# Patient Record
(demographics unavailable — no encounter records)

---

## 2024-12-31 NOTE — PHYSICAL EXAM
[No Acute Distress] : no acute distress [Well Nourished] : well nourished [Well Developed] : well developed [Well-Appearing] : well-appearing [Normal Sclera/Conjunctiva] : normal sclera/conjunctiva [EOMI] : extraocular movements intact [Normal Outer Ear/Nose] : the outer ears and nose were normal in appearance [No Respiratory Distress] : no respiratory distress  [No Accessory Muscle Use] : no accessory muscle use [Clear to Auscultation] : lungs were clear to auscultation bilaterally [Normal Rate] : normal rate  [Regular Rhythm] : with a regular rhythm [Normal S1, S2] : normal S1 and S2 [No Murmur] : no murmur heard [No Edema] : there was no peripheral edema [Soft] : abdomen soft [Non Tender] : non-tender [Non-distended] : non-distended [Coordination Grossly Intact] : coordination grossly intact [No Focal Deficits] : no focal deficits [Normal Affect] : the affect was normal [Normal Insight/Judgement] : insight and judgment were intact

## 2024-12-31 NOTE — PAST MEDICAL HISTORY
[Menstruating] : menstruating [Definite ___ (Date)] : the last menstrual period was [unfilled] [Less Bleeding] : the period was lighter than normal [Normal Duration] : the duration was normal [Regular Cycle Intervals] : have been regular [Total Preg ___] : G[unfilled]

## 2025-01-03 NOTE — END OF VISIT
[] : Resident [FreeTextEntry3] : I, Dr. Allan Hughes, saw and evaluated this patient in the presence of the resident. I discussed the management with the resident. I reviewed the note and agree with the documented plan of care with the following confirmations/corrections/additions: None.

## 2025-01-03 NOTE — HEALTH RISK ASSESSMENT
[No] : No [No falls in past year] : Patient reported no falls in the past year [0] : 2) Feeling down, depressed, or hopeless: Not at all (0) [Patient reported PAP Smear was normal] : Patient reported PAP Smear was normal [With Significant Other] : lives with significant other [# of Members in Household ___] :  household currently consist of [unfilled] member(s) [Employed] : employed [] :  [Sexually Active] : sexually active [Feels Safe at Home] : Feels safe at home [Smoke Detector] : smoke detector [Carbon Monoxide Detector] : carbon monoxide detector [Seat Belt] :  uses seat belt [Sunscreen] : uses sunscreen [Never] : Never [NO] : No [PHQ-2 Negative - No further assessment needed] : PHQ-2 Negative - No further assessment needed [Good] : ~his/her~  mood as  good [de-identified] : walking [de-identified] : balanced [[id number]] : 0 [Reports changes in hearing] : Reports no changes in hearing [Reports changes in vision] : Reports no changes in vision [PapSmearDate] : 09/24 [HIVComments] : Tested during pregnancy = Negative [HepatitisCComments] : Tested during pregnancy = Negative [de-identified] :  and child [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

## 2025-01-03 NOTE — HISTORY OF PRESENT ILLNESS
[Other: _____] : [unfilled] [FreeTextEntry1] : New Establishment of Care; Medical approval to return to work [de-identified] : 30F, 5-month postpartum here to establish new care and medical approval to return to work. Previously seen by unknown primary care provider 2-3 years ago. [facility], last appointment in November. Has no acute complaints except for mild knee pain. Pt unaware of hx of anemia when told it was seen on her cbc previously. no sxms of fatigue, chest pain, [place], sob.

## 2025-01-03 NOTE — HEALTH RISK ASSESSMENT
[No] : No [No falls in past year] : Patient reported no falls in the past year [0] : 2) Feeling down, depressed, or hopeless: Not at all (0) [Patient reported PAP Smear was normal] : Patient reported PAP Smear was normal [With Significant Other] : lives with significant other [# of Members in Household ___] :  household currently consist of [unfilled] member(s) [Employed] : employed [] :  [Sexually Active] : sexually active [Feels Safe at Home] : Feels safe at home [Smoke Detector] : smoke detector [Carbon Monoxide Detector] : carbon monoxide detector [Seat Belt] :  uses seat belt [Sunscreen] : uses sunscreen [Never] : Never [NO] : No [PHQ-2 Negative - No further assessment needed] : PHQ-2 Negative - No further assessment needed [Good] : ~his/her~  mood as  good [de-identified] : walking [de-identified] : balanced [[id number]] : 0 [Reports changes in hearing] : Reports no changes in hearing [Reports changes in vision] : Reports no changes in vision [PapSmearDate] : 09/24 [HIVComments] : Tested during pregnancy = Negative [HepatitisCComments] : Tested during pregnancy = Negative [de-identified] :  and child [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

## 2025-01-03 NOTE — REVIEW OF SYSTEMS
[Joint Pain] : joint pain [Fever] : no fever [Chills] : no chills [Night Sweats] : no night sweats [Chest Pain] : no chest pain [Orthopnea] : no orthopnea [Shortness Of Breath] : no shortness of breath [Abdominal Pain] : no abdominal pain [Nausea] : no nausea [Vomiting] : no vomiting [Heartburn] : no heartburn [Dysuria] : no dysuria [Hematuria] : no hematuria [Vaginal Discharge] : no vaginal discharge [Itching] : no itching [Skin Rash] : no skin rash [Headache] : no headache [Dizziness] : no dizziness [Unsteady Walking] : no ataxia [FreeTextEntry9] : Knee Pain

## 2025-01-03 NOTE — PLAN
[FreeTextEntry1] : 1) Hx of Anemia  Low Hb seen in HIE, but patient unaware as she was never told ROS negative, PE unremarkable -F/u Iron, TIBC, Ferritin -Continuing taking Iron supplements  2) Health Maintenance Presenting for establishment of new care, last seen by PCP 2-3 years ago Has no acute complaints and would like clearance to return to work -f/u Staff Health Form -f/u CBC, CMP, Lipid Panel -f/u MMRV, Quant Gold -Obtain outside vaccine records as patient follows with OBGYN Educated about importance of healthy diet, physical activity (45 min 5 days a week moderate intensity exercises), proper sleep (8 hours of sleep), importance of screening test for early detection and treatment of cancer. Importance of immunizations including COVID-19 and seasonal flu vaccine in order to prevent or lessen disease.

## 2025-01-03 NOTE — HISTORY OF PRESENT ILLNESS
[Other: _____] : [unfilled] [FreeTextEntry1] : New Establishment of Care; Medical approval to return to work [de-identified] : 30F, 5-month postpartum here to establish new care and medical approval to return to work. Previously seen by unknown primary care provider 2-3 years ago. [facility], last appointment in November. Has no acute complaints except for mild knee pain. Pt unaware of hx of anemia when told it was seen on her cbc previously. no sxms of fatigue, chest pain, [place], sob.

## 2025-04-17 NOTE — HISTORY OF PRESENT ILLNESS
[FreeTextEntry1] : follow up visit [de-identified] : 31F PMHx anemia presents for follow up to discuss left wrist pain. Pt reports has been hold infant with right arm and using left hand with wrist flexion to hold her sons head when breastfeeding. Pt has not tried to make the pain better or worse.

## 2025-04-17 NOTE — ASSESSMENT
[FreeTextEntry1] : Carpel tunnel left wrist - positive phalens - advised wear carpal tunnel sleeve qhs - PMR referral